# Patient Record
Sex: FEMALE | Race: WHITE | Employment: PART TIME | URBAN - METROPOLITAN AREA
[De-identification: names, ages, dates, MRNs, and addresses within clinical notes are randomized per-mention and may not be internally consistent; named-entity substitution may affect disease eponyms.]

---

## 2017-02-07 LAB
ABO/RH: NORMAL
AMPHETAMINE SCREEN, URINE: NORMAL
ANTIBODY SCREEN: NORMAL
BARBITURATE SCREEN URINE: NORMAL
BENZODIAZEPINE SCREEN, URINE: NORMAL
CANNABINOID SCREEN URINE: NORMAL
COCAINE METABOLITE SCREEN URINE: NORMAL
HEPATITIS C ANTIBODY INTERPRETATION: NORMAL
Lab: NORMAL
METHADONE SCREEN, URINE: NORMAL
OPIATE SCREEN URINE: NORMAL
OXYCODONE URINE: NORMAL
PH UA: 6
PHENCYCLIDINE SCREEN URINE: NORMAL
PROPOXYPHENE SCREEN: NORMAL

## 2017-02-08 LAB
BASOPHILS ABSOLUTE: 0 K/UL (ref 0–0.2)
BASOPHILS RELATIVE PERCENT: 0.3 %
EOSINOPHILS ABSOLUTE: 0.1 K/UL (ref 0–0.6)
EOSINOPHILS RELATIVE PERCENT: 0.6 %
HCT VFR BLD CALC: 37 % (ref 36–48)
HEMOGLOBIN: 12.6 G/DL (ref 12–16)
HEPATITIS B SURFACE ANTIGEN INTERPRETATION: ABNORMAL
HIV-1 AND HIV-2 ANTIBODIES: NORMAL
LYMPHOCYTES ABSOLUTE: 2.2 K/UL (ref 1–5.1)
LYMPHOCYTES RELATIVE PERCENT: 16.6 %
MCH RBC QN AUTO: 29.2 PG (ref 26–34)
MCHC RBC AUTO-ENTMCNC: 34.1 G/DL (ref 31–36)
MCV RBC AUTO: 85.4 FL (ref 80–100)
MONOCYTES ABSOLUTE: 1 K/UL (ref 0–1.3)
MONOCYTES RELATIVE PERCENT: 7.3 %
NEUTROPHILS ABSOLUTE: 9.9 K/UL (ref 1.7–7.7)
NEUTROPHILS RELATIVE PERCENT: 75.2 %
PDW BLD-RTO: 13.6 % (ref 12.4–15.4)
PLATELET # BLD: 185 K/UL (ref 135–450)
PMV BLD AUTO: 9.7 FL (ref 5–10.5)
RBC # BLD: 4.34 M/UL (ref 4–5.2)
RPR: ABNORMAL
RUBELLA ANTIBODY IGG: 88.1 IU/ML
WBC # BLD: 13.1 K/UL (ref 4–11)
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
## 2017-03-07 ENCOUNTER — HOSPITAL ENCOUNTER (OUTPATIENT)
Dept: OTHER | Age: 27
Discharge: HOME | End: 2017-03-07
Attending: OBSTETRICS & GYNECOLOGY | Admitting: OBSTETRICS & GYNECOLOGY

## 2017-03-07 LAB
GLUCOSE CHALLENGE: 98 MG/DL
HCT VFR BLD CALC: 36.1 % (ref 36–48)
HEMOGLOBIN: 12.3 G/DL (ref 12–16)
MCH RBC QN AUTO: 29.2 PG (ref 26–34)
MCHC RBC AUTO-ENTMCNC: 34 G/DL (ref 31–36)
MCV RBC AUTO: 85.9 FL (ref 80–100)
PDW BLD-RTO: 13.3 % (ref 12.4–15.4)
PLATELET # BLD: 185 K/UL (ref 135–450)
PMV BLD AUTO: 9 FL (ref 5–10.5)
RBC # BLD: 4.2 M/UL (ref 4–5.2)
WBC # BLD: 13.1 K/UL (ref 4–11)

## 2019-12-26 LAB
AMPHETAMINE SCREEN, URINE: NORMAL
BARBITURATE SCREEN URINE: NORMAL
BENZODIAZEPINE SCREEN, URINE: NORMAL
CANNABINOID SCREEN URINE: NORMAL
COCAINE METABOLITE SCREEN URINE: NORMAL
Lab: NORMAL
METHADONE SCREEN, URINE: NORMAL
OPIATE SCREEN URINE: NORMAL
OXYCODONE URINE: NORMAL
PH UA: 6.5
PHENCYCLIDINE SCREEN URINE: NORMAL
PROPOXYPHENE SCREEN: NORMAL

## 2019-12-27 LAB — URINE CULTURE, ROUTINE: NORMAL

## 2020-01-18 ENCOUNTER — HOSPITAL ENCOUNTER (OUTPATIENT)
Age: 30
Discharge: HOME | End: 2020-01-18
Payer: COMMERCIAL

## 2020-01-18 LAB
ABO/RH: NORMAL
ANTIBODY SCREEN: NORMAL
GLUCOSE CHALLENGE: 118 MG/DL
HEPATITIS C ANTIBODY INTERPRETATION: NORMAL

## 2020-01-18 PROCEDURE — 82950 GLUCOSE TEST: CPT

## 2020-01-18 PROCEDURE — 86850 RBC ANTIBODY SCREEN: CPT

## 2020-01-18 PROCEDURE — 86702 HIV-2 ANTIBODY: CPT

## 2020-01-18 PROCEDURE — 86901 BLOOD TYPING SEROLOGIC RH(D): CPT

## 2020-01-18 PROCEDURE — 86780 TREPONEMA PALLIDUM: CPT

## 2020-01-18 PROCEDURE — 87340 HEPATITIS B SURFACE AG IA: CPT

## 2020-01-18 PROCEDURE — 85025 COMPLETE CBC W/AUTO DIFF WBC: CPT

## 2020-01-18 PROCEDURE — 86900 BLOOD TYPING SEROLOGIC ABO: CPT

## 2020-01-18 PROCEDURE — 87390 HIV-1 AG IA: CPT

## 2020-01-18 PROCEDURE — 36415 COLL VENOUS BLD VENIPUNCTURE: CPT

## 2020-01-18 PROCEDURE — 86762 RUBELLA ANTIBODY: CPT

## 2020-01-18 PROCEDURE — 86803 HEPATITIS C AB TEST: CPT

## 2020-01-18 PROCEDURE — 86701 HIV-1ANTIBODY: CPT

## 2020-01-19 LAB
BASOPHILS ABSOLUTE: 0.1 K/UL (ref 0–0.2)
BASOPHILS RELATIVE PERCENT: 0.5 %
EOSINOPHILS ABSOLUTE: 0.1 K/UL (ref 0–0.6)
EOSINOPHILS RELATIVE PERCENT: 1.1 %
HCT VFR BLD CALC: 35.3 % (ref 36–48)
HEMOGLOBIN: 12.3 G/DL (ref 12–16)
HEPATITIS B SURFACE ANTIGEN INTERPRETATION: ABNORMAL
HIV AG/AB: NORMAL
HIV ANTIGEN: NORMAL
HIV-1 ANTIBODY: NORMAL
HIV-2 AB: NORMAL
LYMPHOCYTES ABSOLUTE: 1.7 K/UL (ref 1–5.1)
LYMPHOCYTES RELATIVE PERCENT: 15.5 %
MCH RBC QN AUTO: 31.4 PG (ref 26–34)
MCHC RBC AUTO-ENTMCNC: 34.7 G/DL (ref 31–36)
MCV RBC AUTO: 90.3 FL (ref 80–100)
MONOCYTES ABSOLUTE: 0.8 K/UL (ref 0–1.3)
MONOCYTES RELATIVE PERCENT: 8 %
NEUTROPHILS ABSOLUTE: 8 K/UL (ref 1.7–7.7)
NEUTROPHILS RELATIVE PERCENT: 74.9 %
PDW BLD-RTO: 13.1 % (ref 12.4–15.4)
PLATELET # BLD: 190 K/UL (ref 135–450)
PMV BLD AUTO: 9.1 FL (ref 5–10.5)
RBC # BLD: 3.91 M/UL (ref 4–5.2)
RUBELLA ANTIBODY IGG: 63.1 IU/ML
TOTAL SYPHILLIS IGG/IGM: ABNORMAL
WBC # BLD: 10.6 K/UL (ref 4–11)

## 2020-02-14 ENCOUNTER — HOSPITAL ENCOUNTER (OUTPATIENT)
Age: 30
Discharge: HOME | End: 2020-02-14
Attending: OBSTETRICS & GYNECOLOGY | Admitting: OBSTETRICS & GYNECOLOGY
Payer: COMMERCIAL

## 2020-02-14 VITALS
TEMPERATURE: 98.1 F | DIASTOLIC BLOOD PRESSURE: 64 MMHG | RESPIRATION RATE: 16 BRPM | HEART RATE: 93 BPM | SYSTOLIC BLOOD PRESSURE: 114 MMHG

## 2020-02-14 LAB — RHIG LOT NUMBER: NORMAL

## 2020-02-14 PROCEDURE — 6360000002 HC RX W HCPCS: Performed by: OBSTETRICS & GYNECOLOGY

## 2020-02-14 PROCEDURE — 96372 THER/PROPH/DIAG INJ SC/IM: CPT

## 2020-02-14 RX ADMIN — HUMAN RHO(D) IMMUNE GLOBULIN 300 MCG: 300 INJECTION, SOLUTION INTRAMUSCULAR at 12:04

## 2021-07-28 LAB
AMPHETAMINE SCREEN, URINE: NORMAL
BARBITURATE SCREEN URINE: NORMAL
BENZODIAZEPINE SCREEN, URINE: NORMAL
CANNABINOID SCREEN URINE: NORMAL
COCAINE METABOLITE SCREEN URINE: NORMAL
Lab: NORMAL
METHADONE SCREEN, URINE: NORMAL
OPIATE SCREEN URINE: NORMAL
OXYCODONE URINE: NORMAL
PH UA: 6
PHENCYCLIDINE SCREEN URINE: NORMAL
PROPOXYPHENE SCREEN: NORMAL

## 2021-07-29 LAB
ABO/RH: NORMAL
ANTIBODY SCREEN: NORMAL
BASOPHILS ABSOLUTE: 0.1 K/UL (ref 0–0.2)
BASOPHILS RELATIVE PERCENT: 0.4 %
EOSINOPHILS ABSOLUTE: 0.2 K/UL (ref 0–0.6)
EOSINOPHILS RELATIVE PERCENT: 1.3 %
HCT VFR BLD CALC: 30.3 % (ref 36–48)
HEMOGLOBIN: 10.2 G/DL (ref 12–16)
HEPATITIS B SURFACE ANTIGEN INTERPRETATION: ABNORMAL
HEPATITIS C ANTIBODY INTERPRETATION: NORMAL
HIV AG/AB: NORMAL
HIV ANTIGEN: NORMAL
HIV-1 ANTIBODY: NORMAL
HIV-2 AB: NORMAL
HPV COMMENT: NORMAL
HPV TYPE 16: NOT DETECTED
HPV TYPE 18: NOT DETECTED
HPVOH (OTHER TYPES): NOT DETECTED
LYMPHOCYTES ABSOLUTE: 2.1 K/UL (ref 1–5.1)
LYMPHOCYTES RELATIVE PERCENT: 14.5 %
MCH RBC QN AUTO: 28.6 PG (ref 26–34)
MCHC RBC AUTO-ENTMCNC: 33.6 G/DL (ref 31–36)
MCV RBC AUTO: 85.1 FL (ref 80–100)
MONOCYTES ABSOLUTE: 1.1 K/UL (ref 0–1.3)
MONOCYTES RELATIVE PERCENT: 7.7 %
NEUTROPHILS ABSOLUTE: 11.1 K/UL (ref 1.7–7.7)
NEUTROPHILS RELATIVE PERCENT: 76.1 %
PDW BLD-RTO: 13.5 % (ref 12.4–15.4)
PLATELET # BLD: 252 K/UL (ref 135–450)
PMV BLD AUTO: 8.3 FL (ref 5–10.5)
RBC # BLD: 3.56 M/UL (ref 4–5.2)
RUBELLA ANTIBODY IGG: 70.5 IU/ML
TOTAL SYPHILLIS IGG/IGM: ABNORMAL
URINE CULTURE, ROUTINE: NORMAL
WBC # BLD: 14.6 K/UL (ref 4–11)

## 2021-08-09 ENCOUNTER — HOSPITAL ENCOUNTER (OUTPATIENT)
Age: 31
Discharge: HOME | End: 2021-08-09
Attending: OBSTETRICS & GYNECOLOGY | Admitting: OBSTETRICS & GYNECOLOGY
Payer: COMMERCIAL

## 2021-08-09 VITALS
HEART RATE: 96 BPM | RESPIRATION RATE: 16 BRPM | SYSTOLIC BLOOD PRESSURE: 118 MMHG | DIASTOLIC BLOOD PRESSURE: 69 MMHG | TEMPERATURE: 98 F

## 2021-08-09 LAB
GLUCOSE CHALLENGE: 120 MG/DL
RHIG LOT NUMBER: NORMAL

## 2021-08-09 PROCEDURE — 96372 THER/PROPH/DIAG INJ SC/IM: CPT

## 2021-08-09 PROCEDURE — 6360000002 HC RX W HCPCS: Performed by: OBSTETRICS & GYNECOLOGY

## 2021-08-09 RX ADMIN — HUMAN RHO(D) IMMUNE GLOBULIN 300 MCG: 300 INJECTION, SOLUTION INTRAMUSCULAR at 14:42

## 2021-08-09 NOTE — FLOWSHEET NOTE
Patient presents to [facility] triage for rhogam administration. Orders reviewed. All questions answered. Consent signed. Administered as ordered. Discharged ambulatory with all belongings.

## 2021-09-15 LAB
HCT VFR BLD CALC: 32.2 % (ref 36–48)
HEMOGLOBIN: 10.7 G/DL (ref 12–16)
MCH RBC QN AUTO: 28.5 PG (ref 26–34)
MCHC RBC AUTO-ENTMCNC: 33.4 G/DL (ref 31–36)
MCV RBC AUTO: 85.4 FL (ref 80–100)
PDW BLD-RTO: 15.8 % (ref 12.4–15.4)
PLATELET # BLD: 177 K/UL (ref 135–450)
PMV BLD AUTO: 8.6 FL (ref 5–10.5)
RBC # BLD: 3.77 M/UL (ref 4–5.2)
WBC # BLD: 12.3 K/UL (ref 4–11)

## 2021-10-09 ENCOUNTER — HOSPITAL ENCOUNTER (INPATIENT)
Age: 31
LOS: 2 days | Discharge: HOME | DRG: 560 | End: 2021-10-11
Attending: OBSTETRICS & GYNECOLOGY | Admitting: OBSTETRICS & GYNECOLOGY
Payer: COMMERCIAL

## 2021-10-09 ENCOUNTER — ANESTHESIA EVENT (OUTPATIENT)
Dept: LABOR AND DELIVERY | Age: 31
DRG: 560 | End: 2021-10-09
Payer: COMMERCIAL

## 2021-10-09 ENCOUNTER — ANESTHESIA (OUTPATIENT)
Dept: LABOR AND DELIVERY | Age: 31
DRG: 560 | End: 2021-10-09
Payer: COMMERCIAL

## 2021-10-09 PROBLEM — O47.9 UTERINE CONTRACTIONS DURING PREGNANCY: Status: ACTIVE | Noted: 2021-10-09

## 2021-10-09 LAB
ABO/RH: NORMAL
AMPHETAMINE SCREEN, URINE: NORMAL
ANTIBODY SCREEN: NORMAL
BARBITURATE SCREEN URINE: NORMAL
BENZODIAZEPINE SCREEN, URINE: NORMAL
BUPRENORPHINE URINE: NORMAL
CANNABINOID SCREEN URINE: NORMAL
COCAINE METABOLITE SCREEN URINE: NORMAL
HCT VFR BLD CALC: 33.2 % (ref 36–48)
HEMOGLOBIN: 11.6 G/DL (ref 12–16)
Lab: NORMAL
MCH RBC QN AUTO: 29.3 PG (ref 26–34)
MCHC RBC AUTO-ENTMCNC: 35.1 G/DL (ref 31–36)
MCV RBC AUTO: 83.6 FL (ref 80–100)
METHADONE SCREEN, URINE: NORMAL
OPIATE SCREEN URINE: NORMAL
OXYCODONE URINE: NORMAL
PDW BLD-RTO: 16.5 % (ref 12.4–15.4)
PH UA: 6.5
PHENCYCLIDINE SCREEN URINE: NORMAL
PLATELET # BLD: 163 K/UL (ref 135–450)
PMV BLD AUTO: 8.5 FL (ref 5–10.5)
PROPOXYPHENE SCREEN: NORMAL
RBC # BLD: 3.97 M/UL (ref 4–5.2)
SARS-COV-2, NAAT: NOT DETECTED
WBC # BLD: 12.5 K/UL (ref 4–11)

## 2021-10-09 PROCEDURE — 87635 SARS-COV-2 COVID-19 AMP PRB: CPT

## 2021-10-09 PROCEDURE — 6360000002 HC RX W HCPCS: Performed by: OBSTETRICS & GYNECOLOGY

## 2021-10-09 PROCEDURE — 1220000000 HC SEMI PRIVATE OB R&B

## 2021-10-09 PROCEDURE — 7200000001 HC VAGINAL DELIVERY

## 2021-10-09 PROCEDURE — 85027 COMPLETE CBC AUTOMATED: CPT

## 2021-10-09 PROCEDURE — 80307 DRUG TEST PRSMV CHEM ANLYZR: CPT

## 2021-10-09 PROCEDURE — 10907ZC DRAINAGE OF AMNIOTIC FLUID, THERAPEUTIC FROM PRODUCTS OF CONCEPTION, VIA NATURAL OR ARTIFICIAL OPENING: ICD-10-PCS | Performed by: OBSTETRICS & GYNECOLOGY

## 2021-10-09 PROCEDURE — 51702 INSERT TEMP BLADDER CATH: CPT

## 2021-10-09 PROCEDURE — 86780 TREPONEMA PALLIDUM: CPT

## 2021-10-09 PROCEDURE — 86900 BLOOD TYPING SEROLOGIC ABO: CPT

## 2021-10-09 PROCEDURE — 3700000025 EPIDURAL BLOCK: Performed by: ANESTHESIOLOGY

## 2021-10-09 PROCEDURE — 86850 RBC ANTIBODY SCREEN: CPT

## 2021-10-09 PROCEDURE — 6360000002 HC RX W HCPCS: Performed by: ANESTHESIOLOGY

## 2021-10-09 PROCEDURE — 2580000003 HC RX 258: Performed by: OBSTETRICS & GYNECOLOGY

## 2021-10-09 PROCEDURE — 86901 BLOOD TYPING SEROLOGIC RH(D): CPT

## 2021-10-09 RX ORDER — FENTANYL/BUPIVACAINE/NS/PF 2-1250MCG
PLASTIC BAG, INJECTION (ML) INJECTION
Status: COMPLETED
Start: 2021-10-09 | End: 2021-10-09

## 2021-10-09 RX ORDER — MISOPROSTOL 100 UG/1
800 TABLET ORAL PRN
Status: DISCONTINUED | OUTPATIENT
Start: 2021-10-09 | End: 2021-10-11 | Stop reason: HOSPADM

## 2021-10-09 RX ORDER — SODIUM CHLORIDE 9 MG/ML
25 INJECTION, SOLUTION INTRAVENOUS PRN
Status: DISCONTINUED | OUTPATIENT
Start: 2021-10-09 | End: 2021-10-09

## 2021-10-09 RX ORDER — OXYCODONE HYDROCHLORIDE 5 MG/1
10 TABLET ORAL EVERY 4 HOURS PRN
Status: DISCONTINUED | OUTPATIENT
Start: 2021-10-09 | End: 2021-10-11 | Stop reason: HOSPADM

## 2021-10-09 RX ORDER — SODIUM CHLORIDE 0.9 % (FLUSH) 0.9 %
5-40 SYRINGE (ML) INJECTION EVERY 12 HOURS SCHEDULED
Status: DISCONTINUED | OUTPATIENT
Start: 2021-10-09 | End: 2021-10-11 | Stop reason: HOSPADM

## 2021-10-09 RX ORDER — SODIUM CHLORIDE, SODIUM LACTATE, POTASSIUM CHLORIDE, CALCIUM CHLORIDE 600; 310; 30; 20 MG/100ML; MG/100ML; MG/100ML; MG/100ML
1000 INJECTION, SOLUTION INTRAVENOUS PRN
Status: DISCONTINUED | OUTPATIENT
Start: 2021-10-09 | End: 2021-10-09

## 2021-10-09 RX ORDER — SODIUM CHLORIDE 0.9 % (FLUSH) 0.9 %
5-40 SYRINGE (ML) INJECTION EVERY 12 HOURS SCHEDULED
Status: DISCONTINUED | OUTPATIENT
Start: 2021-10-09 | End: 2021-10-09

## 2021-10-09 RX ORDER — SODIUM CHLORIDE 0.9 % (FLUSH) 0.9 %
5-40 SYRINGE (ML) INJECTION PRN
Status: DISCONTINUED | OUTPATIENT
Start: 2021-10-09 | End: 2021-10-09

## 2021-10-09 RX ORDER — POLYETHYLENE GLYCOL 3350 17 G/17G
17 POWDER, FOR SOLUTION ORAL DAILY
Status: DISCONTINUED | OUTPATIENT
Start: 2021-10-10 | End: 2021-10-11 | Stop reason: HOSPADM

## 2021-10-09 RX ORDER — ACETAMINOPHEN 500 MG
1000 TABLET ORAL EVERY 6 HOURS PRN
Status: DISCONTINUED | OUTPATIENT
Start: 2021-10-09 | End: 2021-10-11 | Stop reason: HOSPADM

## 2021-10-09 RX ORDER — BISACODYL 10 MG
10 SUPPOSITORY, RECTAL RECTAL DAILY PRN
Status: DISCONTINUED | OUTPATIENT
Start: 2021-10-09 | End: 2021-10-11 | Stop reason: HOSPADM

## 2021-10-09 RX ORDER — DOCUSATE SODIUM 100 MG/1
100 CAPSULE, LIQUID FILLED ORAL 2 TIMES DAILY
Status: DISCONTINUED | OUTPATIENT
Start: 2021-10-09 | End: 2021-10-11 | Stop reason: HOSPADM

## 2021-10-09 RX ORDER — ACETAMINOPHEN 500 MG
1000 TABLET ORAL EVERY 6 HOURS PRN
Qty: 120 TABLET | Refills: 3 | Status: SHIPPED | OUTPATIENT
Start: 2021-10-09

## 2021-10-09 RX ORDER — DOCUSATE SODIUM 100 MG/1
100 CAPSULE, LIQUID FILLED ORAL DAILY PRN
Qty: 60 CAPSULE | Refills: 1 | Status: SHIPPED | OUTPATIENT
Start: 2021-10-09

## 2021-10-09 RX ORDER — MODIFIED LANOLIN
OINTMENT (GRAM) TOPICAL PRN
Status: DISCONTINUED | OUTPATIENT
Start: 2021-10-09 | End: 2021-10-11 | Stop reason: HOSPADM

## 2021-10-09 RX ORDER — SODIUM CHLORIDE, SODIUM LACTATE, POTASSIUM CHLORIDE, CALCIUM CHLORIDE 600; 310; 30; 20 MG/100ML; MG/100ML; MG/100ML; MG/100ML
INJECTION, SOLUTION INTRAVENOUS CONTINUOUS
Status: DISCONTINUED | OUTPATIENT
Start: 2021-10-09 | End: 2021-10-09

## 2021-10-09 RX ORDER — METHYLERGONOVINE MALEATE 0.2 MG/ML
200 INJECTION INTRAVENOUS PRN
Status: DISCONTINUED | OUTPATIENT
Start: 2021-10-09 | End: 2021-10-11 | Stop reason: HOSPADM

## 2021-10-09 RX ORDER — SIMETHICONE 80 MG
80 TABLET,CHEWABLE ORAL EVERY 6 HOURS PRN
Status: DISCONTINUED | OUTPATIENT
Start: 2021-10-09 | End: 2021-10-11 | Stop reason: HOSPADM

## 2021-10-09 RX ORDER — SODIUM CHLORIDE 0.9 % (FLUSH) 0.9 %
5-40 SYRINGE (ML) INJECTION PRN
Status: DISCONTINUED | OUTPATIENT
Start: 2021-10-09 | End: 2021-10-11 | Stop reason: HOSPADM

## 2021-10-09 RX ORDER — FENTANYL/BUPIVACAINE/NS/PF 2-1250MCG
12 PLASTIC BAG, INJECTION (ML) INJECTION CONTINUOUS
Status: DISCONTINUED | OUTPATIENT
Start: 2021-10-09 | End: 2021-10-09

## 2021-10-09 RX ORDER — IBUPROFEN 800 MG/1
800 TABLET ORAL EVERY 6 HOURS PRN
Qty: 90 TABLET | Refills: 1 | Status: SHIPPED | OUTPATIENT
Start: 2021-10-09 | End: 2021-11-08

## 2021-10-09 RX ORDER — ONDANSETRON 2 MG/ML
4 INJECTION INTRAMUSCULAR; INTRAVENOUS EVERY 6 HOURS PRN
Status: DISCONTINUED | OUTPATIENT
Start: 2021-10-09 | End: 2021-10-11 | Stop reason: HOSPADM

## 2021-10-09 RX ORDER — FERROUS SULFATE 325(65) MG
325 TABLET ORAL 2 TIMES DAILY WITH MEALS
Status: DISCONTINUED | OUTPATIENT
Start: 2021-10-10 | End: 2021-10-11 | Stop reason: HOSPADM

## 2021-10-09 RX ORDER — IBUPROFEN 800 MG/1
800 TABLET ORAL EVERY 8 HOURS PRN
Status: DISCONTINUED | OUTPATIENT
Start: 2021-10-09 | End: 2021-10-11 | Stop reason: HOSPADM

## 2021-10-09 RX ORDER — SODIUM CHLORIDE, SODIUM LACTATE, POTASSIUM CHLORIDE, CALCIUM CHLORIDE 600; 310; 30; 20 MG/100ML; MG/100ML; MG/100ML; MG/100ML
500 INJECTION, SOLUTION INTRAVENOUS PRN
Status: DISCONTINUED | OUTPATIENT
Start: 2021-10-09 | End: 2021-10-09

## 2021-10-09 RX ORDER — ONDANSETRON 2 MG/ML
4 INJECTION INTRAMUSCULAR; INTRAVENOUS EVERY 6 HOURS PRN
Status: DISCONTINUED | OUTPATIENT
Start: 2021-10-09 | End: 2021-10-09

## 2021-10-09 RX ORDER — DOCUSATE SODIUM 100 MG/1
100 CAPSULE, LIQUID FILLED ORAL 2 TIMES DAILY
Status: DISCONTINUED | OUTPATIENT
Start: 2021-10-09 | End: 2021-10-09

## 2021-10-09 RX ORDER — CARBOPROST TROMETHAMINE 250 UG/ML
250 INJECTION, SOLUTION INTRAMUSCULAR PRN
Status: DISCONTINUED | OUTPATIENT
Start: 2021-10-09 | End: 2021-10-11 | Stop reason: HOSPADM

## 2021-10-09 RX ORDER — SODIUM CHLORIDE, SODIUM LACTATE, POTASSIUM CHLORIDE, CALCIUM CHLORIDE 600; 310; 30; 20 MG/100ML; MG/100ML; MG/100ML; MG/100ML
INJECTION, SOLUTION INTRAVENOUS CONTINUOUS
Status: DISCONTINUED | OUTPATIENT
Start: 2021-10-09 | End: 2021-10-11 | Stop reason: HOSPADM

## 2021-10-09 RX ORDER — SODIUM CHLORIDE 9 MG/ML
25 INJECTION, SOLUTION INTRAVENOUS PRN
Status: DISCONTINUED | OUTPATIENT
Start: 2021-10-09 | End: 2021-10-11 | Stop reason: HOSPADM

## 2021-10-09 RX ORDER — OXYCODONE HYDROCHLORIDE 5 MG/1
5 TABLET ORAL EVERY 4 HOURS PRN
Status: DISCONTINUED | OUTPATIENT
Start: 2021-10-09 | End: 2021-10-11 | Stop reason: HOSPADM

## 2021-10-09 RX ORDER — LIDOCAINE HYDROCHLORIDE AND EPINEPHRINE 15; 5 MG/ML; UG/ML
INJECTION, SOLUTION EPIDURAL PRN
Status: DISCONTINUED | OUTPATIENT
Start: 2021-10-09 | End: 2021-10-09 | Stop reason: SDUPTHER

## 2021-10-09 RX ADMIN — SODIUM CHLORIDE, POTASSIUM CHLORIDE, SODIUM LACTATE AND CALCIUM CHLORIDE 1000 ML: 600; 310; 30; 20 INJECTION, SOLUTION INTRAVENOUS at 11:42

## 2021-10-09 RX ADMIN — SODIUM CHLORIDE, POTASSIUM CHLORIDE, SODIUM LACTATE AND CALCIUM CHLORIDE: 600; 310; 30; 20 INJECTION, SOLUTION INTRAVENOUS at 11:57

## 2021-10-09 RX ADMIN — Medication 666.7 MILLI-UNITS/MIN: at 22:29

## 2021-10-09 RX ADMIN — SODIUM CHLORIDE, POTASSIUM CHLORIDE, SODIUM LACTATE AND CALCIUM CHLORIDE: 600; 310; 30; 20 INJECTION, SOLUTION INTRAVENOUS at 12:13

## 2021-10-09 RX ADMIN — LIDOCAINE HYDROCHLORIDE AND EPINEPHRINE 3 ML: 15; 5 INJECTION, SOLUTION EPIDURAL at 14:11

## 2021-10-09 RX ADMIN — Medication 1 MILLI-UNITS/MIN: at 15:05

## 2021-10-09 RX ADMIN — Medication 12 ML/HR: at 14:17

## 2021-10-09 RX ADMIN — SODIUM CHLORIDE, POTASSIUM CHLORIDE, SODIUM LACTATE AND CALCIUM CHLORIDE: 600; 310; 30; 20 INJECTION, SOLUTION INTRAVENOUS at 17:24

## 2021-10-09 RX ADMIN — LIDOCAINE HYDROCHLORIDE AND EPINEPHRINE 2 ML: 15; 5 INJECTION, SOLUTION EPIDURAL at 14:17

## 2021-10-09 ASSESSMENT — ENCOUNTER SYMPTOMS: SHORTNESS OF BREATH: 0

## 2021-10-09 NOTE — FLOWSHEET NOTE
DR Kyle Otoole notified pt in triage rating contraction pain at a 6 and cervical exam of 4cm/90. Orders given to admit pt to L&D.

## 2021-10-09 NOTE — H&P
Department of Obstetrics and Gynecology   Obstetrics History and Physical        CHIEF COMPLAINT:  contractions    HISTORY OF PRESENT ILLNESS:      The patient is a 28 y.o. female at 36w0d. OB History        4    Para   3    Term   3       0    AB   0    Living   2       SAB   0    TAB   0    Ectopic   0    Molar        Multiple   0    Live Births   2            Patient presents with a chief complaint as above and is being admitted for active phase labor    Estimated Due Date: Estimated Date of Delivery: 10/16/21    PRENATAL CARE:    Complicated by: Rh neg, Late PNC    PAST OB HISTORY:  OB History        4    Para   3    Term   3       0    AB   0    Living   2       SAB   0    TAB   0    Ectopic   0    Molar        Multiple   0    Live Births   2                Past Medical History:        Diagnosis Date    Anemia     taking iron    Rh incompatibility     Had Rhogam     Past Surgical History:    History reviewed. No pertinent surgical history. Allergies:  Patient has no known allergies.     Social History:    Social History     Socioeconomic History    Marital status: Single     Spouse name: Not on file    Number of children: Not on file    Years of education: Not on file    Highest education level: Not on file   Occupational History    Not on file   Tobacco Use    Smoking status: Former Smoker     Years: 7.00     Quit date: 2015     Years since quittin.0    Smokeless tobacco: Never Used   Vaping Use    Vaping Use: Never used   Substance and Sexual Activity    Alcohol use: No    Drug use: No    Sexual activity: Not Currently     Partners: Male   Other Topics Concern    Not on file   Social History Narrative    Not on file     Social Determinants of Health     Financial Resource Strain:     Difficulty of Paying Living Expenses:    Food Insecurity:     Worried About Running Out of Food in the Last Year:     [address] in the Last Year:    Transportation Needs:     Lack of Transportation (Medical):      Lack of Transportation (Non-Medical):    Physical Activity:     Days of Exercise per Week:     Minutes of Exercise per Session:    Stress:     Feeling of Stress :    Social Connections:     Frequency of Communication with Friends and Family:     Frequency of Social Gatherings with Friends and Family:     Attends [employer] Services:     Active Member of Clubs or Organizations:     Attends Club or Organization Meetings:     Marital Status:    Intimate Partner Violence:     Fear of Current or Ex-Partner:     Emotionally Abused:     Physically Abused:     Sexually Abused:      Family History:       Problem Relation Age of Onset    Diabetes Maternal Grandmother     High Blood Pressure Maternal Grandmother     Diabetes Maternal Grandfather     High Blood Pressure Maternal Grandfather     Diabetes Paternal Grandmother     High Blood Pressure Paternal Grandmother     Diabetes Paternal Grandfather     High Blood Pressure Paternal Grandfather      Medications Prior to Admission:  Medications Prior to Admission: Prenatal Vit-Fe Fumarate-FA (PRENATAL VITAMIN) 27-0.8 MG TABS, Take 1 tablet by mouth daily  Prenatal Vit-Fe Fumarate-FA (PRENATAL ONE DAILY PO), Take by mouth  ibuprofen (ADVIL;MOTRIN) 800 MG tablet, Take 1 tablet by mouth every 6 hours as needed for Pain    REVIEW OF SYSTEMS:    CONSTITUTIONAL:  negative  RESPIRATORY:  negative  CARDIOVASCULAR:  negative  GASTROINTESTINAL:  negative  ALLERGIC/IMMUNOLOGIC:  negative  NEUROLOGICAL:  negative  BEHAVIOR/PSYCH:  negative    PHYSICAL EXAM:  Vitals:    10/09/21 1340 10/09/21 1412 10/09/21 1417 10/09/21 1423   BP:  122/65 (!) 114/57 (!) 112/58   Pulse:  78 96 109   Resp: 18 18 18 18   Temp:   98.5 °F (36.9 °C)    SpO2:  100% 100% 98%   Weight:       Height:         General appearance:  awake, alert, cooperative, no apparent distress, and appears stated age  Neurologic:  Awake, alert, oriented to name, place and time. Lungs:  No increased work of breathing, good air exchange  Abdomen:  Soft, non tender, gravid, consistent with her gestational age, EFW by Daria's manouever was 3600 gms   Fetal heart rate:  Reassuring.   Pelvis:  Adequate pelvis  Cervix: 5 cm 90% soft -2  Contraction frequency:  4 minutes    Membranes:  Ruptured clear fluid, AROM    ASSESSMENT AND PLAN:    Labor: Admit, anticipate normal delivery, routine labor orders  Fetus: Reassuring  GBS: No

## 2021-10-09 NOTE — ANESTHESIA PROCEDURE NOTES
Epidural Block    Patient location during procedure: OB  Start time: 10/9/2021 2:06 PM  End time: 10/9/2021 2:11 PM  Reason for block: labor epidural  Staffing  Performed: resident/CRNA   Resident/CRNA: VAUGHN Delgado - MARYCARMEN  Preanesthetic Checklist  Completed: patient identified, IV checked, site marked, risks and benefits discussed, surgical consent, monitors and equipment checked, pre-op evaluation, timeout performed, anesthesia consent given, oxygen available and patient being monitored  Epidural  Patient position: sitting  Prep: ChloraPrep  Patient monitoring: cardiac monitor, continuous pulse ox and frequent blood pressure checks  Approach: midline  Location: lumbar (1-5)  Injection technique: REZA air  Provider prep: mask and sterile gloves  Needle  Needle type: Tuohy   Needle gauge: 17 G  Needle length: 3.5 in  Needle insertion depth: 5.5 cm  Catheter type: side hole  Catheter size: 19 G (20 G)  Catheter at skin depth: 11 cm  Test dose: negative  Assessment  Sensory level: T10  Hemodynamics: stable  Attempts: 1  Additional Notes  Called for labor epidural analgesia request. Medical and Surgical history reviewed with pt. Risks/benefits of epidural discussed including allergic reaction, infection, bleeding, hypotension, headache, back pain, nerve damage, failed or one-sided block. Also discussed anesthesia options and associated risks in the event of . Questions answered. Verbalizes understanding and requests to proceed. Pt in sitting position. Labor epidural placed using REZA sterile technique (donned mask and sterile gloves). Back prepped with Chloraprepx2. Sterile drape applied. Site: L4-5  REZA:    5.5 cm. Attempts:  1   Re-directs:  0  Site infiltrated with 1%Lidocaine. 17G Tuohy needle inserted, REZA technique with saline. No heme, CSF, or paresthesias noted. Epidural space dilated with saline. #25ga pencan needle placed through tuohy for dural puncture. +CSF -heme or paresthesia. Spinal needle removed. Threaded epidural catheter through Tuohy needle easily. Tuohy needle withdrawn. Test Dose:           Negative aspiration. 3cc of 1.5% Lido with epi 1:200,000 test dose given. Negative test dose. Skin:  Xcm catheter taped at the skin. Secured with steri strips, tegaderm, and tape. Infusion:  . 125% Bupivacaine with Fentanyl (2ug/cc)  Auto bolus 4 ml every 20 minutes. (Max. Dose- 40 ml/hr.)      Sensory Level:  R:  T1o L:  T10    VAS: start 7/10, end 0/10    Patient in supine position with left uterine displacement.  VSS:

## 2021-10-09 NOTE — ANESTHESIA PRE PROCEDURE
Department of Anesthesiology  Preprocedure Note       Name:  Krissy Zafar   Age:  28 y.o.  :  1990                                          MRN:  [id number]         Date:  10/9/2021      Surgeon: * No surgeons listed *    Procedure: * No procedures listed *    Medications prior to admission:   Prior to Admission medications    Medication Sig Start Date End Date Taking?  Authorizing Provider   Prenatal Vit-Fe Fumarate-FA (PRENATAL VITAMIN) 27-0.8 MG TABS Take 1 tablet by mouth daily 17  Yes HARRIET Holley   Prenatal Vit-Fe Fumarate-FA (PRENATAL ONE DAILY PO) Take by mouth   Yes Historical Provider, MD   ibuprofen (ADVIL;MOTRIN) 800 MG tablet Take 1 tablet by mouth every 6 hours as needed for Pain 17   Catherine Cook MD       Current medications:    Current Facility-Administered Medications   Medication Dose Route Frequency Provider Last Rate Last Admin    lactated ringers infusion   IntraVENous Continuous Flo Lynch  mL/hr at 10/09/21 1213 New Bag at 10/09/21 1213    lactated ringers infusion 500 mL  500 mL IntraVENous PRN Flo Lynch MD        Or    lactated ringers infusion 1,000 mL  1,000 mL IntraVENous PRN Flo Lynch  mL/hr at 10/09/21 1142 1,000 mL at 10/09/21 1142    sodium chloride flush 0.9 % injection 5-40 mL  5-40 mL IntraVENous 2 times per day Flo Lynch MD        sodium chloride flush 0.9 % injection 5-40 mL  5-40 mL IntraVENous PRN Flo Lynch MD        0.9 % sodium chloride infusion  25 mL IntraVENous PRN Flo Lynch MD        oxytocin (PITOCIN) 30 units in 500 mL infusion  87.3 star-units/min IntraVENous Continuous PRN Flo Lynch MD        And    oxytocin (PITOCIN) 10 unit bolus from the bag  10 Units IntraVENous PRN Flo Lynch MD        ondansetron [facility]) injection 4 mg  4 mg IntraVENous Q6H PRN Flo Lynch MD        docusate sodium (COLACE) capsule 100 mg  100 mg Oral BID Flo Lynch MD Allergies:  No Known Allergies    Problem List:    Patient Active Problem List   Diagnosis Code     (spontaneous vaginal delivery) O80    Fall (on)(from) sidewalk curb, initial encounter W10. 1XXA    Spontaneous vaginal delivery O80       Past Medical History:        Diagnosis Date    Anemia     taking iron    Rh incompatibility     Had Rhogam       Past Surgical History:  History reviewed. No pertinent surgical history. Social History:    Social History     Tobacco Use    Smoking status: Former Smoker     Years: 7.00     Quit date: 2015     Years since quittin.0    Smokeless tobacco: Never Used   Substance Use Topics    Alcohol use: No                                Counseling given: Not Answered      Vital Signs (Current):   Vitals:    10/09/21 1037 10/09/21 1041 10/09/21 1046 10/09/21 1210   BP: 126/73 122/62 (!) 119/58    Pulse: 100 100 96    Resp:    18   Temp:       Weight:       Height:                                                  BP Readings from Last 3 Encounters:   10/09/21 (!) 119/58   21 118/69   20 114/64       NPO Status: Time of last liquid consumption: 900                        Time of last solid consumption: 900                        Date of last liquid consumption: 10/09/21                        Date of last solid food consumption: 10/09/21    BMI:   Wt Readings from Last 3 Encounters:   10/09/21 177 lb (80.3 kg)   17 182 lb (82.6 kg)   17 183 lb (83 kg)     Body mass index is 30.86 kg/m².     CBC:   Lab Results   Component Value Date    WBC 12.5 10/09/2021    RBC 3.97 10/09/2021    HGB 11.6 10/09/2021    HCT 33.2 10/09/2021    MCV 83.6 10/09/2021    RDW 16.5 10/09/2021     10/09/2021       CMP:   Lab Results   Component Value Date     2016    K 3.7 2016     2016    CO2 23 2016    BUN 18 2016    CREATININE 0.8 2016    GFRAA >60 2016    AGRATIO 1.2 2016    LABGLOM >60 attending and CRNA.     Attending anesthesiologist reviewed and agrees with Preprocedure content              VAUGHN Powell - CRNA   10/9/2021

## 2021-10-10 VITALS
OXYGEN SATURATION: 100 % | BODY MASS INDEX: 30.22 KG/M2 | SYSTOLIC BLOOD PRESSURE: 105 MMHG | WEIGHT: 177 LBS | RESPIRATION RATE: 18 BRPM | TEMPERATURE: 98 F | HEART RATE: 84 BPM | HEIGHT: 64 IN | DIASTOLIC BLOOD PRESSURE: 61 MMHG

## 2021-10-10 LAB
HCT VFR BLD CALC: 28.2 % (ref 36–48)
HEMOGLOBIN: 9.6 G/DL (ref 12–16)
MCH RBC QN AUTO: 28.9 PG (ref 26–34)
MCHC RBC AUTO-ENTMCNC: 34.2 G/DL (ref 31–36)
MCV RBC AUTO: 84.5 FL (ref 80–100)
PDW BLD-RTO: 16 % (ref 12.4–15.4)
PLATELET # BLD: 143 K/UL (ref 135–450)
PMV BLD AUTO: 8.4 FL (ref 5–10.5)
RBC # BLD: 3.33 M/UL (ref 4–5.2)
TOTAL SYPHILLIS IGG/IGM: NORMAL
WBC # BLD: 28 K/UL (ref 4–11)

## 2021-10-10 PROCEDURE — 85027 COMPLETE CBC AUTOMATED: CPT

## 2021-10-10 PROCEDURE — 1200000000 HC SEMI PRIVATE

## 2021-10-10 PROCEDURE — 6370000000 HC RX 637 (ALT 250 FOR IP): Performed by: OBSTETRICS & GYNECOLOGY

## 2021-10-10 PROCEDURE — 51701 INSERT BLADDER CATHETER: CPT

## 2021-10-10 RX ADMIN — IBUPROFEN 800 MG: 800 TABLET, FILM COATED ORAL at 12:32

## 2021-10-10 RX ADMIN — ACETAMINOPHEN 1000 MG: 500 TABLET ORAL at 16:30

## 2021-10-10 RX ADMIN — IBUPROFEN 800 MG: 800 TABLET, FILM COATED ORAL at 03:36

## 2021-10-10 RX ADMIN — ACETAMINOPHEN 1000 MG: 500 TABLET ORAL at 09:19

## 2021-10-10 RX ADMIN — IBUPROFEN 800 MG: 800 TABLET, FILM COATED ORAL at 20:36

## 2021-10-10 RX ADMIN — DOCUSATE SODIUM 100 MG: 100 CAPSULE, LIQUID FILLED ORAL at 09:19

## 2021-10-10 RX ADMIN — DOCUSATE SODIUM 100 MG: 100 CAPSULE, LIQUID FILLED ORAL at 20:36

## 2021-10-10 ASSESSMENT — PAIN SCALES - GENERAL
PAINLEVEL_OUTOF10: 7
PAINLEVEL_OUTOF10: 4
PAINLEVEL_OUTOF10: 6
PAINLEVEL_OUTOF10: 6
PAINLEVEL_OUTOF10: 5

## 2021-10-10 NOTE — CARE COORDINATION
Received a call from Jael blair/[email or web address]( cell [phone]). They are currently active with the patient and her other children. Terrance Pizarro has asked that the hospital not allow the baby to discharge with the patient until they are able to have a meeting and create a plan. Notified bedside nurse.

## 2021-10-10 NOTE — PROCEDURES
Department of Obstetrics and Gynecology  Spontaneous Vaginal Delivery Note    Labor & Delivery Summary  Dilation Complete Date: 10/09/21  Dilation Complete Time:     Pre-operative Diagnosis:  Term pregnancy, Spontaneous labor and Pregnancy complicated by: late care    Post-operative Diagnosis:  Same, delivered    Procedure:  Spontaneous vaginal delivery    Surgeon:  Inez Wayne MD    Information for the patient's :  Urban Dailey [[id number]]     Donna Banuelos [[id number]]    Apgars    Living status: Living  Apgars   1 Minute:  5 Minute:  10 Minute 15 Minute 20 Minute   Skin Color: 1  1       Heart Rate: 2  2       Reflex Irritability: 2  2       Muscle Tone: 2  2       Respiratory Effort: 1  2       Total: 8  9               Apgars Assigned By: Contreras Laurent RN             Information for the patient's :  Donna Banuelos [[id number]]   Birth Weight: 8 lb 9.6 oz (3.9 kg)       Anesthesia:  epidural anesthesia    Estimated blood loss:  300ml    Specimen:  Placenta not sent to pathology     Cord gas sent Yes    Complications:  none    Condition:  infant stable to general nursery and mother stable    Details of Procedure: The patient is a 28 y.o. female at 39w0d   OB History        4    Para   4    Term   4       0    AB   0    Living   4       SAB   0    TAB   0    Ectopic   0    Molar        Multiple   0    Live Births   4             who was admitted for active phase labor. She received the following interventions: none. The patient progressed  did receive an epidural, became complete and started to push. After pushing for 5  min the fetal head was at the perineum, the nose and mouth suctioned with bulb suction and the rest of the infant delivered atraumatically, and was placed on the mother's abdomen. The cord was clamped and cut after 1 minute delay. The delivery of the placenta was spontaneous.  The perineum and vagina were explored and no lacerations

## 2021-10-10 NOTE — PROGRESS NOTES
[facility] post-partum note    Post-partum Day #1    Subjective:  Pain is : Mild  Lochia: staining only  Nausea: None  Bowel Movement/Flatus:  yes  Breastfeeding: plans to breastfeed    Objective:Patient Vitals for the past 24 hrs:   BP Temp Pulse Resp SpO2 Height Weight   10/10/21 0550 (!) 98/50 98 °F (36.7 °C) 85 16 -- -- --   10/10/21 0155 119/71 98 °F (36.7 °C) 97 18 -- -- --   10/10/21 0029 (!) 115/59 98.2 °F (36.8 °C) 110 18 -- -- --   10/10/21 0014 (!) 114/59 -- 109 -- -- -- --   10/09/21 2359 (!) 117/56 -- 114 -- -- -- --   10/09/21 2347 (!) 115/56 -- 107 -- -- -- --   10/09/21 2314 (!) 121/58 -- 100 -- -- -- --   10/09/21 2259 127/60 -- 104 -- -- -- --   10/09/21 2244 (!) 113/58 -- 107 -- -- -- --   10/09/21 2230 (!) 112/54 98.2 °F (36.8 °C) 105 18 100 % -- --   10/09/21 2155 (!) 113/57 -- 92 -- -- -- --   10/09/21 2124 117/72 -- 94 -- 94 % -- --   10/09/21 2054 115/61 -- 90 -- 95 % -- --   10/09/21 2024 (!) 120/59 -- 104 -- 97 % -- --   10/09/21 1954 (!) 103/59 -- 109 -- 96 % -- --   10/09/21 1925 118/65 -- 94 -- -- -- --   10/09/21 1854 116/61 -- 100 18 97 % -- --   10/09/21 1850 -- -- -- 18 -- -- --   10/09/21 1824 (!) 105/52 98 °F (36.7 °C) 90 18 96 % -- --   10/09/21 1820 -- -- -- 18 -- -- --   10/09/21 1754 (!) 112/57 -- 76 18 94 % -- --   10/09/21 1750 -- -- -- 18 -- -- --   10/09/21 1724 115/61 -- 78 18 93 % -- --   10/09/21 1720 -- -- -- 18 -- -- --   10/09/21 1655 (!) 109/59 98 °F (36.7 °C) 72 18 93 % -- --   10/09/21 1624 112/62 -- 82 18 94 % -- --   10/09/21 1620 -- -- -- 18 94 % -- --   10/09/21 1555 109/64 97.9 °F (36.6 °C) 82 18 96 % -- --   10/09/21 1550 -- -- -- 18 93 % -- --   10/09/21 1524 (!) 98/54 98.4 °F (36.9 °C) 82 18 94 % -- --   10/09/21 1520 -- -- -- 18 -- -- --   10/09/21 1454 (!) 91/54 -- 83 -- 96 % -- --   10/09/21 1450 -- -- -- 18 93 % -- --   10/09/21 1423 (!) 112/58 -- 109 18 98 % -- --   10/09/21 1417 (!) 114/57 98.5 °F (36.9 °C) 96 18 100 % -- --   10/09/21 1412 122/65 -- 78 18 100 % -- --   10/09/21 1340 -- -- -- 18 -- -- --   10/09/21 1310 -- -- -- 18 -- -- --   10/09/21 1240 -- -- -- 18 -- -- --   10/09/21 1210 -- -- -- 18 -- -- --   10/09/21 1046 (!) 119/58 -- 96 -- -- -- --   10/09/21 1041 122/62 -- 100 -- -- -- --   10/09/21 1037 126/73 -- 100 -- -- -- --   10/09/21 1032 -- 98.3 °F (36.8 °C) -- 20 -- 5' 3.5\" (1.613 m) 177 lb (80.3 kg)   10/09/21 1031 115/66 -- 103 -- -- -- --      Abdominal exam: soft, nontender, nondistended, no masses or organomegaly.        Lab Results   Component Value Date    WBC 28.0 (H) 10/10/2021    HGB 9.6 (L) 10/10/2021    HCT 28.2 (L) 10/10/2021    MCV 84.5 10/10/2021     10/10/2021          Current Facility-Administered Medications:     lactated ringers infusion, , IntraVENous, Continuous, Karo West MD    sodium chloride flush 0.9 % injection 5-40 mL, 5-40 mL, IntraVENous, 2 times per day, Karo West MD    sodium chloride flush 0.9 % injection 5-40 mL, 5-40 mL, IntraVENous, PRN, Karo West MD    0.9 % sodium chloride infusion, 25 mL, IntraVENous, PRN, Karo West MD    acetaminophen (TYLENOL) tablet 1,000 mg, 1,000 mg, Oral, Q6H PRN, Karo West MD    rho(D) immune globulin (HYPERRHO S/D) injection 300 mcg, 300 mcg, IntraMUSCular, Once, Karo West MD    docusate sodium (COLACE) capsule 100 mg, 100 mg, Oral, BID, Karo West MD    lansinoh lanolin ointment, , Topical, PRN, Karo West MD    benzocaine-menthol (DERMOPLAST) 20-0.5 % spray, , Topical, PRN, Karo West MD    measles, mumps & rubella vaccine (MMR) injection 0.5 mL, 0.5 mL, SubCUTAneous, Prior to discharge, Karo West MD    Tetanus-Diphth-Acell Pertussis (BOOSTRIX) injection 0.5 mL, 0.5 mL, IntraMUSCular, Prior to discharge, Karo West MD    ibuprofen (ADVIL;MOTRIN) tablet 800 mg, 800 mg, Oral, Q8H PRN, Karo West MD, 800 mg at 10/10/21 0336    oxyCODONE (Eddie Leigh) immediate release tablet 5 mg, 5 mg, Oral, Q4H PRN **OR** oxyCODONE (ROXICODONE) immediate release tablet 10 mg, 10 mg, Oral, Q4H PRN, Edgar Greene MD    [facility]) chewable tablet 80 mg, 80 mg, Oral, Q6H PRN, Edgar Greene MD    polyethylene glycol [facility]) packet 17 g, 17 g, Oral, Daily, Edgar Greene MD    magnesium hydroxide (MILK OF MAGNESIA) 400 MG/5ML suspension 30 mL, 30 mL, Oral, Daily PRN, Edgar Greene MD    bisacodyl (DULCOLAX) suppository 10 mg, 10 mg, Rectal, Daily PRN, Edgar Greene MD    ondansetron [facility]) injection 4 mg, 4 mg, IntraVENous, Q6H PRN, Edgar Greene MD    carboprost (HEMABATE) injection 250 mcg, 250 mcg, IntraMUSCular, PRN, Edgar Greene MD    methylergonovine (METHERGINE) injection 200 mcg, 200 mcg, IntraMUSCular, PRN, Edgar Greene MD    miSOPROStol (CYTOTEC) tablet 800 mcg, 800 mcg, Rectal, PRN, Edgar Greene MD    ferrous sulfate (IRON 325) tablet 325 mg, 325 mg, Oral, BID WC, Edgar Greene MD     Assessment/Plan:      Continue Postpartum care  Discharge today: yes  Follow up: 6 weeks

## 2021-10-10 NOTE — ANESTHESIA POSTPROCEDURE EVALUATION
Department of Anesthesiology  Postprocedure Note    Patient: Judy Ann  MRN: [id number]  YOB: 1990  Date of evaluation: 10/10/2021  Time:  7:42 PM     Procedure Summary     Date: 10/09/21 Room / Location:     Anesthesia Start: [address] [place] [address] Anesthesia Stop: 2224    Procedure: Labor Analgesia Diagnosis:     Scheduled Providers:  Responsible Provider: Cristian Roth MD    Anesthesia Type: epidural ASA Status: 2          Anesthesia Type: epidural    Conner Phase I:      Conner Phase II:      Last vitals: Reviewed and per EMR flowsheets. Anesthesia Post Evaluation    Patient location during evaluation: floor  Patient participation: complete - patient participated  Level of consciousness: awake and alert  Pain score: 0  Airway patency: patent  Nausea & Vomiting: no vomiting and no nausea  Complications: no  Cardiovascular status: hemodynamically stable  Respiratory status: room air  Hydration status: stable  Multimodal analgesia pain management approach    Patient s/p epidural for L&D. Pt denies residual numbness post block. Patient is ambulating and voiding without difficulty. Patient denies back pain, headache, paresthesias, n/v or pruritus. Epidural site is free of signs of infection.

## 2021-10-10 NOTE — FLOWSHEET NOTE
Patient transferred to postpartum by Karen Brown RN and settled into postpartum room. Pt oriented to folder and postpartum care. Oriented to call light, phone and ordering meals. Postpartum RN's name and phone number posted for pt. Siderails up x2. Pt oriented to equipment. Report given. Pt included in discussion and all questions answered. Assisted up to the BR per 2 assist.  Able to void 700 ml. Pericare done and returned to bed. Tolerated well.

## 2021-10-11 LAB
BASOPHILS ABSOLUTE: 0.1 K/UL (ref 0–0.2)
BASOPHILS RELATIVE PERCENT: 0.4 %
EOSINOPHILS ABSOLUTE: 0.3 K/UL (ref 0–0.6)
EOSINOPHILS RELATIVE PERCENT: 1.8 %
HCT VFR BLD CALC: 26.8 % (ref 36–48)
HEMOGLOBIN: 9.1 G/DL (ref 12–16)
LYMPHOCYTES ABSOLUTE: 2.6 K/UL (ref 1–5.1)
LYMPHOCYTES RELATIVE PERCENT: 18.8 %
MCH RBC QN AUTO: 29.5 PG (ref 26–34)
MCHC RBC AUTO-ENTMCNC: 34.1 G/DL (ref 31–36)
MCV RBC AUTO: 86.3 FL (ref 80–100)
MONOCYTES ABSOLUTE: 0.9 K/UL (ref 0–1.3)
MONOCYTES RELATIVE PERCENT: 6.6 %
NEUTROPHILS ABSOLUTE: 10.2 K/UL (ref 1.7–7.7)
NEUTROPHILS RELATIVE PERCENT: 72.4 %
PDW BLD-RTO: 16.5 % (ref 12.4–15.4)
PLATELET # BLD: 120 K/UL (ref 135–450)
PMV BLD AUTO: 8.3 FL (ref 5–10.5)
RBC # BLD: 3.1 M/UL (ref 4–5.2)
WBC # BLD: 14 K/UL (ref 4–11)

## 2021-10-11 PROCEDURE — 6370000000 HC RX 637 (ALT 250 FOR IP): Performed by: OBSTETRICS & GYNECOLOGY

## 2021-10-11 PROCEDURE — 85025 COMPLETE CBC W/AUTO DIFF WBC: CPT

## 2021-10-11 RX ADMIN — DOCUSATE SODIUM 100 MG: 100 CAPSULE, LIQUID FILLED ORAL at 09:07

## 2021-10-11 RX ADMIN — IBUPROFEN 800 MG: 800 TABLET, FILM COATED ORAL at 05:57

## 2021-10-11 RX ADMIN — ACETAMINOPHEN 1000 MG: 500 TABLET ORAL at 09:07

## 2021-10-11 RX ADMIN — FERROUS SULFATE TAB 325 MG (65 MG ELEMENTAL FE) 325 MG: 325 (65 FE) TAB at 09:13

## 2021-10-11 RX ADMIN — ACETAMINOPHEN 1000 MG: 500 TABLET ORAL at 03:12

## 2021-10-11 ASSESSMENT — PAIN SCALES - GENERAL
PAINLEVEL_OUTOF10: 8
PAINLEVEL_OUTOF10: 7
PAINLEVEL_OUTOF10: 4

## 2021-10-11 NOTE — DISCHARGE SUMMARY
Obstetrical Discharge Form    Gestational Age: 39w0d    Antepartum complications: none    Date of Delivery: 10/11/21      Type of Delivery: vaginal, spontaneous    Delivered By: Edyta Perry     Baby:      Information for the patient's :  Sandy Fisher [[id number]]   APGAR One: 6     Information for the patient's :  Danii Banuelos [[id number]]   APGAR Five: 9     Information for the patient's :  Sandy Fisher [[id number]]   Birth Weight: 8 lb 9.6 oz (3.9 kg)       Anesthesia: Epidural    Intrapartum complications: None    Postpartum complications: none    Discharge Medication:    Meron Bhakta   Lula Medication Instructions RYQ:[id number]    Printed on:10/11/21 6273   Medication Information                      acetaminophen (APAP EXTRA STRENGTH) 500 MG tablet  Take 2 tablets by mouth every 6 hours as needed for Pain             docusate sodium (COLACE) 100 MG capsule  Take 1 capsule by mouth daily as needed for Constipation             ibuprofen (ADVIL;MOTRIN) 800 MG tablet  Take 1 tablet by mouth every 6 hours as needed for Pain             Prenatal Vit-Fe Fumarate-FA (PRENATAL ONE DAILY PO)  Take by mouth                  Discharge Condition:  good    Discharge Date: 10/11/21    PLAN:  Follow up in 6 weeks for routine PP visit or sooner for any problems. All questions answered  D/C summary begun at delivery for D/C planning purposes, any delay in discharge from ordered D/C date due to  factors.

## 2021-10-11 NOTE — PLAN OF CARE
Problem: Discharge Planning:  Goal: Discharged to appropriate level of care  10/11/2021 1218 by Aj Romero RN  Outcome: Completed  10/10/2021 2325 by Negar Meredith RN  Outcome: Ongoing     Problem: Constipation:  Goal: Bowel elimination is within specified parameters  10/11/2021 1218 by Aj Romero RN  Outcome: Completed  10/10/2021 2325 by Negar Meredith RN  Outcome: Ongoing     Problem: Fluid Volume - Imbalance:  Goal: Absence of imbalanced fluid volume signs and symptoms  10/11/2021 1218 by Aj Romero RN  Outcome: Completed  10/10/2021 2325 by Negar Meredith RN  Outcome: Ongoing  Goal: Absence of postpartum hemorrhage signs and symptoms  10/11/2021 1218 by Aj Romero RN  Outcome: Completed  10/10/2021 2325 by Negar Meredith RN  Outcome: Ongoing     Problem: Infection - Risk of, Puerperal Infection:  Goal: Will show no infection signs and symptoms  10/11/2021 1218 by Aj Romero RN  Outcome: Completed  10/10/2021 2325 by Negar Meredith RN  Outcome: Ongoing     Problem: Mood - Altered:  Goal: Mood stable  10/11/2021 1218 by Aj Romero RN  Outcome: Completed  10/10/2021 2325 by Negar Meredith RN  Outcome: Ongoing     Problem: Pain - Acute:  Goal: Pain level will decrease  10/11/2021 1218 by Aj Romero RN  Outcome: Completed  10/10/2021 2325 by Negar Meredith RN  Outcome: Ongoing

## 2021-10-11 NOTE — CARE COORDINATION
Late Entry:  SW received a call from [employer] ([phone]). [facility] is currently active with the patient and her other children. Antolin Worley has asked that  [facility] not allow the baby to discharge with [facility] with patient. Antolin Worley stating that [facility] [address] worker Selwyn German ([phone]) is on her way to [facility] to meet with patient.     Electronically signed by LALY Oliva on 10/11/2021 at 1:25 PM

## 2021-10-11 NOTE — FLOWSHEET NOTE
Pt wanting to leave ama. Dr Horton Cheadle rounded on pt. Verbal order for discharge. Postpartum teaching completed and forms signed by patient. Copy witnessed by RN and given to patient. Patient verbalized understanding of all teaching points. Prescriptions given if applicable. Patient plans to follow-up with [facility] Provider as instructed. Patient verbalizes understanding of discharge instructions and denies further questions. Pt's vehicle here at hospital. MD and RN discouraged pt from driving herself but she states she has no other transportation. Pt refused wheelchair. Discharge from unit.

## 2021-10-11 NOTE — CARE COORDINATION
DWIGHT met with MOB and CPS worker Rosa Blas. [facility] CPS worker Rosa Lab informed MOB that her baby would be taken into custody today by CPS and placed with the  that has MOB's other 3 children. MOB was not in agreement with this decision and was crying and upset. [place] CPS worker informed MOB that there would be a court hearing on tomorrow and suggested that the MOB be in attendance. Place: Juvenile Court   [address] [place] [address]  Date: 2021  Time:  8:30 AM    Baby was taken to the [facility] by patient's RN, Rodrigo Lazo. MOB's CPS worker [address] [place] [address] Parent will  baby at [facility] today between 4:00 pm and 5:00 PM.      SW met with MOB and completed the Mom/Baby Assessment. Case Management Mom/Baby Assessment    Identifying Information    Mother of Baby: Juvencio Danger  Mother's :    1991                                Father of Baby: Ton Justice  Father's : 1985  Father's Address: [address] [place] [address],[address] [place] [address], [address] [place] [address]  Father's Phone #: [phone]    Baby's Name:   Shea Croft                                   Delivery Date: 10/09/2021  Weeks:      44             Days: 0  Nursing concerns for baby: No concerns  Apgar Scores:   [facility]: [facility]    Reason for Referral: CPS request. CPS stating that MOB and FOB have substance abuse history and have 3 other children in custody of [facility] CPS. Assessment Information    MOB Discharge Address: [address] [place] [address],[address] [place] [address], [address] [place] [address]    MOB Phone #: [phone]    MOB Resides with: FOB    MOB Emergency Contact: Annelise Soriano mother) [phone]. Phone #: [phone]    MOB Support System: None  Phone #:    Other Children    Name: Moises Deng        : 2016  Name: Brooklynn Caceres          : 2017  Name: Guichoazultomasa Dixon           FWB:    Custody: Children are in the custody of [facility] CPS    Father of Baby Involvement: Lives with MOB.   FOB has not been to the hospital to visit MOB or baby. Have you ever had contact with Children's Services (describe): Presently involved with CPS. Car Seat: Yes I have. Diapers:Yes I have. Crib/Bassinet: Yes I have. Feeding: Yes I have. [place]:Yes I have. WIC: No  Medicaid: I have [employer]  Food [place]: Yes  Help Me Grow/Every Child Succeeds: No  Transportation: No  Cash Assistance: No    Level of Education:  Occupation: KELLI reports that she works at [employer] in [place]. She works part-time. History  Of:  Domestic Abuse: Reports None. Physical Abuse: Reports None. Sexual Abuse: Reports None. Drug Abuse: Yes  Prescription /Pharmacy Name Location: [facility] [place]  Number:  Depression:Yes  Medication/Counseling: KELLI attends counseling at [facility] [phone]    Summary: CPS stating that MOB and FOB have substance abuse history and have 3 other children in custody of [facility] CPS. MOB's CPS worker Bret Allred  and Phyllis Montague Parent will  baby at [facility] today between 4:00 pm and 5:00 PM. MOB may be discharged when medically stable. Referrals: None    Intervention: Baby taken to [facility].     Electronically signed by LALY Blum on 10/11/2021 at 2:22 PM

## 2021-10-11 NOTE — FLOWSHEET NOTE
Julianne Miramontes and [facility]  came to unit to talk with mom. Infant not going home with MOB. Infant going in to foster care. MOB distraught. Infant taken to the nursery due to risk of unsafe behavior by mom.

## 2022-05-17 NOTE — FLOWSHEET NOTE
Noted--leanna discussed with patient and called in the Effexor   Patient actively pushing from [phone] to [phone]. RN remains in continuous attendance at the bedside. Assessment & evaluation of fetal heart rate ongoing via continuous EFM     Home scripts given to pt. Use and side effects explained to pt. Scripts placed inside  education binder.